# Patient Record
Sex: MALE | Race: WHITE | NOT HISPANIC OR LATINO
[De-identification: names, ages, dates, MRNs, and addresses within clinical notes are randomized per-mention and may not be internally consistent; named-entity substitution may affect disease eponyms.]

---

## 2017-03-15 ENCOUNTER — TRANSCRIPTION ENCOUNTER (OUTPATIENT)
Age: 57
End: 2017-03-15

## 2017-06-23 ENCOUNTER — OUTPATIENT (OUTPATIENT)
Dept: OUTPATIENT SERVICES | Facility: HOSPITAL | Age: 57
LOS: 1 days | Discharge: HOME | End: 2017-06-23

## 2017-06-28 DIAGNOSIS — I24.9 ACUTE ISCHEMIC HEART DISEASE, UNSPECIFIED: ICD-10-CM

## 2017-11-18 ENCOUNTER — TRANSCRIPTION ENCOUNTER (OUTPATIENT)
Age: 57
End: 2017-11-18

## 2019-04-29 ENCOUNTER — TRANSCRIPTION ENCOUNTER (OUTPATIENT)
Age: 59
End: 2019-04-29

## 2019-09-14 ENCOUNTER — TRANSCRIPTION ENCOUNTER (OUTPATIENT)
Age: 59
End: 2019-09-14

## 2019-09-14 ENCOUNTER — EMERGENCY (EMERGENCY)
Facility: HOSPITAL | Age: 59
LOS: 0 days | Discharge: HOME | End: 2019-09-14
Admitting: EMERGENCY MEDICINE
Payer: COMMERCIAL

## 2019-09-14 VITALS
WEIGHT: 244.05 LBS | OXYGEN SATURATION: 99 % | RESPIRATION RATE: 16 BRPM | HEIGHT: 74 IN | HEART RATE: 94 BPM | DIASTOLIC BLOOD PRESSURE: 75 MMHG | TEMPERATURE: 98 F | SYSTOLIC BLOOD PRESSURE: 128 MMHG

## 2019-09-14 DIAGNOSIS — M25.561 PAIN IN RIGHT KNEE: ICD-10-CM

## 2019-09-14 DIAGNOSIS — M25.569 PAIN IN UNSPECIFIED KNEE: ICD-10-CM

## 2019-09-14 DIAGNOSIS — M25.461 EFFUSION, RIGHT KNEE: ICD-10-CM

## 2019-09-14 PROCEDURE — 93971 EXTREMITY STUDY: CPT | Mod: 26,RT

## 2019-09-14 PROCEDURE — 99284 EMERGENCY DEPT VISIT MOD MDM: CPT

## 2019-09-14 PROCEDURE — 73562 X-RAY EXAM OF KNEE 3: CPT | Mod: 26,RT

## 2019-09-14 RX ORDER — OXYCODONE AND ACETAMINOPHEN 5; 325 MG/1; MG/1
1 TABLET ORAL ONCE
Refills: 0 | Status: DISCONTINUED | OUTPATIENT
Start: 2019-09-14 | End: 2019-09-14

## 2019-09-14 RX ADMIN — OXYCODONE AND ACETAMINOPHEN 1 TABLET(S): 5; 325 TABLET ORAL at 16:59

## 2019-09-14 NOTE — ED PROVIDER NOTE - CARE PROVIDER_API CALL
Jeevan Bishop (MD)  Orthopaedic Surgery  [address] [place] [address]  Phone: [phone]  Fax: [phone]  Follow Up Time:

## 2019-09-14 NOTE — ED PROVIDER NOTE - OBJECTIVE STATEMENT
58 y/o male with hx HDL presents to the ED c/o "I woke up with throbbing pain to my right knee this morning. I went to [facility] and they dx me with bursitis and gave me Meloxicam. The pain is radiating up my thigh and down my leg." no hx trauma/ fever/ chills/ numbness/ tingling/ weakness

## 2019-09-14 NOTE — ED ADULT TRIAGE NOTE - CHIEF COMPLAINT QUOTE
"I have pain in my right knee and it's swollen. I went to urgent care this morning and they diagnosed me with bursitis. Now the pain is getting worse."

## 2019-09-14 NOTE — ED PROVIDER NOTE - NSFOLLOWUPINSTRUCTIONS_ED_ALL_ED_FT
Knee Pain    WHAT YOU NEED TO KNOW:    What do I need to know about knee pain? Knee pain may start suddenly, or it may be a long-term problem. You may have pain on the side, front, or back of your knee. You may have knee stiffness and swelling. You may hear popping sounds or feel like your knee is giving way or locking up as you walk. You may feel pain when you sit, stand, walk, or climb up and down stairs.    What increases my risk for knee pain?     A strain or tear in ligaments or tendons      A leg fracture or knee dislocation      Overuse of your knee      Osteoarthritis, rheumatoid arthritis, or gout      An infection, tumor, or cyst in your knee      Shoes that are not supportive, or training on a hard surface      Sports that involve jumping or pivoting on your knee    How is the cause of knee pain diagnosed? Your healthcare provider will examine your knee and ask about your symptoms. Tell your provider when the pain started and what you were doing at the time. Describe the pain, such as sharp, throbbing, or achy. Tell your provider about any knee injury or surgery you had. You may need any of the following:       How is knee pain treated? Treatment will depend on the cause of your pain. You may need any of the following:     NSAIDs help decrease swelling and pain or fever. This medicine is available with or without a doctor's order. NSAIDs can cause stomach bleeding or kidney problems in certain people. If you take blood thinner medicine, always ask your healthcare provider if NSAIDs are safe for you. Always read the medicine label and follow directions.      Acetaminophen decreases pain and fever. It is available without a doctor's order. Ask how much to take and how often to take it. Follow directions. Read the labels of all other medicines you are using to see if they also contain acetaminophen, or ask your doctor or pharmacist. Acetaminophen can cause liver damage if not taken correctly. Do not use more than 4 grams (4,000 milligrams) total of acetaminophen in one day.       Prescription pain medicine may be given. Ask your healthcare provider how to take this medicine safely. Some prescription pain medicines contain acetaminophen. Do not take other medicines that contain acetaminophen without talking to your healthcare provider. Too much acetaminophen may cause liver damage. Prescription pain medicine may cause constipation. Ask your healthcare provider how to prevent or treat constipation.       Steroid injections may be given into your knee. Steroids reduce inflammation and pain.      Surgery may be used for some injuries, such as to repair a torn ACL.    What can I do to manage my symptoms?     Rest your knee so it can heal. Limit activities that increase your pain. Do low-impact exercises, such as walking or swimming.       Apply ice to help reduce swelling and pain. Use an ice pack, or put crushed ice in a plastic bag. Cover it with a towel before you apply it to your knee. Apply ice for 15 to 20 minutes every hour, or as directed.      Apply compression to help reduce swelling. Use a brace or bandage only as directed.      Elevate your knee to help decrease pain and swelling. Elevate your knee while you are sitting or lying down. Prop your leg on pillows to keep your knee above the level of your heart.      Prevent your knee from moving as directed. Your healthcare provider may put on a cast or splint. You may need to wear a leg brace to stabilize your knee. A leg brace can be adjusted to increase your range of motion as your knee heals.Hinged Knee Braces          What can I do to prevent knee pain?     Maintain a healthy weight. Extra weight increases your risk for knee pain. Ask your healthcare provider how much you should weigh. He or she can help you create a safe weight loss plan if you need to lose weight.      Exercise or train properly. Use the correct equipment for sports. Wear shoes that provide good support. Check your posture often as you exercise, play sports, or train for an event. This can help prevent stress and strain on your knees. Rest between sessions so you do not overwork your knees.    When should I seek immediate care?     Your pain is worse, even after treatment.       You cannot bend or straighten your leg completely.       The swelling around your knee does not go down even with treatment.      Your knee is painful and hot to the touch.     When should I contact my healthcare provider?     You have questions or concerns about your condition or care.

## 2019-09-14 NOTE — ED ADULT NURSE NOTE - OBJECTIVE STATEMENT
Patient is c.o of right knee pain and swelling. patient went to [facility] and was diagnosed with bursitis. Limited rom noted to knee. Patient is able to ambulate with minimal assistance noted. Denies any other symptoms or injury to area.

## 2019-09-14 NOTE — ED PROVIDER NOTE - PATIENT PORTAL LINK FT
You can access the FollowMyHealth Patient Portal offered by [facility] by registering at the following website: [email or web address]. By joining [employer]’s FollowMyHealth portal, you will also be able to view your health information using other applications (apps) compatible with our system.

## 2019-09-21 ENCOUNTER — OUTPATIENT (OUTPATIENT)
Dept: OUTPATIENT SERVICES | Facility: HOSPITAL | Age: 59
LOS: 1 days | Discharge: HOME | End: 2019-09-21
Payer: COMMERCIAL

## 2019-09-21 DIAGNOSIS — M25.561 PAIN IN RIGHT KNEE: ICD-10-CM

## 2019-09-21 PROCEDURE — 73721 MRI JNT OF LWR EXTRE W/O DYE: CPT | Mod: 26,RT

## 2020-07-21 PROBLEM — Z00.00 ENCOUNTER FOR PREVENTIVE HEALTH EXAMINATION: Status: ACTIVE | Noted: 2020-07-21

## 2020-09-09 ENCOUNTER — APPOINTMENT (OUTPATIENT)
Dept: UROLOGY | Facility: CLINIC | Age: 60
End: 2020-09-09
Payer: COMMERCIAL

## 2020-09-09 VITALS
WEIGHT: 250 LBS | DIASTOLIC BLOOD PRESSURE: 86 MMHG | BODY MASS INDEX: 32.08 KG/M2 | SYSTOLIC BLOOD PRESSURE: 120 MMHG | TEMPERATURE: 97.7 F | HEIGHT: 74 IN | HEART RATE: 93 BPM

## 2020-09-09 DIAGNOSIS — Z82.61 FAMILY HISTORY OF ARTHRITIS: ICD-10-CM

## 2020-09-09 DIAGNOSIS — Z78.9 OTHER SPECIFIED HEALTH STATUS: ICD-10-CM

## 2020-09-09 DIAGNOSIS — Z83.3 FAMILY HISTORY OF DIABETES MELLITUS: ICD-10-CM

## 2020-09-09 DIAGNOSIS — Z82.49 FAMILY HISTORY OF ISCHEMIC HEART DISEASE AND OTHER DISEASES OF THE CIRCULATORY SYSTEM: ICD-10-CM

## 2020-09-09 DIAGNOSIS — Z80.7 FAMILY HISTORY OF OTHER MALIGNANT NEOPLASMS OF LYMPHOID, HEMATOPOIETIC AND RELATED TISSUES: ICD-10-CM

## 2020-09-09 PROCEDURE — 99205 OFFICE O/P NEW HI 60 MIN: CPT

## 2020-09-09 RX ORDER — ASPIRIN 325 MG/1
TABLET, FILM COATED ORAL
Refills: 0 | Status: ACTIVE | COMMUNITY

## 2020-09-09 NOTE — ASSESSMENT
[FreeTextEntry1] : This is a 60 year male who presents for elevated PSA which came down somewhat on subsequent lab\par \par records from Dr Estrada reviewed below\par August 2019 -3.7\par June 2020- 5.7\par August 2020- 4.3\par \par states had sex and ride bike around the time of the labs\par no blood in the urine\par no associate burning to penis with urination\par \par for ED takes tadalafil without issues and helps well with erections - 5mg as needed\par \par IIEF 5 = 22 - minimal severity erectile dysfunction\par \par IPSS = 10- mostly nocturia x 2 and some weak stream and intermittency - gradual onset, getting worse\par 60g prostate on EARLE

## 2020-09-09 NOTE — PHYSICAL EXAM
[General Appearance - Well Developed] : well developed [General Appearance - Well Nourished] : well nourished [Well Groomed] : well groomed [General Appearance - In No Acute Distress] : no acute distress [Normal Appearance] : normal appearance [Edema] : no peripheral edema [Abdomen Tenderness] : non-tender [Abdomen Soft] : soft [Exaggerated Use Of Accessory Muscles For Inspiration] : no accessory muscle use [Respiration, Rhythm And Depth] : normal respiratory rhythm and effort [Urethral Meatus] : meatus normal [Costovertebral Angle Tenderness] : no ~M costovertebral angle tenderness [Urinary Bladder Findings] : the bladder was normal on palpation [Testes Mass (___cm)] : there were no testicular masses [Scrotum] : the scrotum was normal [No Prostate Nodules] : no prostate nodules [Normal Station and Gait] : the gait and station were normal for the patient's age [Prostate Size ___ gm] : prostate size [unfilled] gm [No Focal Deficits] : no focal deficits [] : no rash [Oriented To Time, Place, And Person] : oriented to person, place, and time [Affect] : the affect was normal [Mood] : the mood was normal [Not Anxious] : not anxious [Skin Color & Pigmentation] : normal skin color and pigmentation [Femoral Lymph Nodes Enlarged Bilaterally] : femoral [Inguinal Lymph Nodes Enlarged Bilaterally] : inguinal

## 2020-09-09 NOTE — LETTER BODY
[Dear  ___] : Dear  [unfilled], [Consult Letter:] : I had the pleasure of evaluating your patient, [unfilled]. [Please see my note below.] : Please see my note below. [Consult Closing:] : Thank you very much for allowing me to participate in the care of this patient.  If you have any questions, please do not hesitate to contact me. [Sincerely,] : Sincerely, [FreeTextEntry3] : Brandon Soto MD\par Director of Male Sexual Dysfunction and Urologic Prosthetics

## 2020-09-24 ENCOUNTER — OUTPATIENT (OUTPATIENT)
Dept: OUTPATIENT SERVICES | Facility: HOSPITAL | Age: 60
LOS: 1 days | Discharge: HOME | End: 2020-09-24
Payer: COMMERCIAL

## 2020-09-24 DIAGNOSIS — R35.1 NOCTURIA: ICD-10-CM

## 2020-09-24 PROCEDURE — 76857 US EXAM PELVIC LIMITED: CPT | Mod: 26

## 2020-10-21 ENCOUNTER — APPOINTMENT (OUTPATIENT)
Dept: UROLOGY | Facility: CLINIC | Age: 60
End: 2020-10-21
Payer: COMMERCIAL

## 2020-10-21 DIAGNOSIS — N32.3 DIVERTICULUM OF BLADDER: ICD-10-CM

## 2020-10-21 DIAGNOSIS — N40.0 BENIGN PROSTATIC HYPERPLASIA WITHOUT LOWER URINARY TRACT SYMPMS: ICD-10-CM

## 2020-10-21 DIAGNOSIS — N52.01 ERECTILE DYSFUNCTION DUE TO ARTERIAL INSUFFICIENCY: ICD-10-CM

## 2020-10-21 DIAGNOSIS — R35.1 NOCTURIA: ICD-10-CM

## 2020-10-21 PROCEDURE — 99214 OFFICE O/P EST MOD 30 MIN: CPT

## 2020-10-21 RX ORDER — TAMSULOSIN HYDROCHLORIDE 0.4 MG/1
0.4 CAPSULE ORAL
Qty: 90 | Refills: 3 | Status: ACTIVE | COMMUNITY
Start: 2020-10-21 | End: 1900-01-01

## 2020-10-21 NOTE — PHYSICAL EXAM
[General Appearance - Well Developed] : well developed [General Appearance - Well Nourished] : well nourished [Normal Appearance] : normal appearance [Well Groomed] : well groomed [General Appearance - In No Acute Distress] : no acute distress [Abdomen Soft] : soft [Abdomen Tenderness] : non-tender [Costovertebral Angle Tenderness] : no ~M costovertebral angle tenderness [Urethral Meatus] : meatus normal [Urinary Bladder Findings] : the bladder was normal on palpation [Scrotum] : the scrotum was normal [Testes Mass (___cm)] : there were no testicular masses [No Prostate Nodules] : no prostate nodules [Prostate Size ___ gm] : prostate size [unfilled] gm [Skin Color & Pigmentation] : normal skin color and pigmentation [Edema] : no peripheral edema [] : no respiratory distress [Respiration, Rhythm And Depth] : normal respiratory rhythm and effort [Exaggerated Use Of Accessory Muscles For Inspiration] : no accessory muscle use [Oriented To Time, Place, And Person] : oriented to person, place, and time [Affect] : the affect was normal [Mood] : the mood was normal [Not Anxious] : not anxious [Normal Station and Gait] : the gait and station were normal for the patient's age [No Focal Deficits] : no focal deficits [Femoral Lymph Nodes Enlarged Bilaterally] : femoral [Inguinal Lymph Nodes Enlarged Bilaterally] : inguinal

## 2020-10-21 NOTE — HISTORY OF PRESENT ILLNESS
[FreeTextEntry1] : This is a 60 year male who presents for elevated PSA which came down somewhat on subsequent lab\par \par records from Dr Estrada reviewed below\par August 2019 -3.7\par June 2020- 5.7\par August 2020- 4.3\par sept 2020 - 3.5, % free psa of 23\par \par states had sex and ride bike around the time of the labs\par no blood in the urine\par no associate burning to penis with urination\par \par for ED takes tadalafil without issues and helps well with erections - 5mg as needed\par \par IIEF 5 = 22 - minimal severity erectile dysfunction\par \par IPSS = 10- mostly nocturia x 2 and some weak stream and intermittency - gradual onset, getting worse\par 60g prostate on EARLE. \par \par Sept 2020-- \par Culture - Urine; no growth \par PSA Profile - Total -- 3.5, % free psa of 23\par Urinalysis w/Reflex; negative\par US Urinary Bladder;  images visualized by me --- prostate 60g with pvr of 200ml, pre 400ml -- small right side diverticulum

## 2021-01-06 ENCOUNTER — APPOINTMENT (OUTPATIENT)
Dept: UROLOGY | Facility: CLINIC | Age: 61
End: 2021-01-06
Payer: COMMERCIAL

## 2021-01-06 VITALS
DIASTOLIC BLOOD PRESSURE: 82 MMHG | HEIGHT: 74 IN | HEART RATE: 75 BPM | WEIGHT: 268 LBS | TEMPERATURE: 97.8 F | SYSTOLIC BLOOD PRESSURE: 131 MMHG | BODY MASS INDEX: 34.39 KG/M2

## 2021-01-06 DIAGNOSIS — R97.20 ELEVATED PROSTATE, SPECIFIC ANTIGEN [PSA]: ICD-10-CM

## 2021-01-06 DIAGNOSIS — N40.1 BENIGN PROSTATIC HYPERPLASIA WITH LOWER URINARY TRACT SYMPMS: ICD-10-CM

## 2021-01-06 DIAGNOSIS — N13.8 BENIGN PROSTATIC HYPERPLASIA WITH LOWER URINARY TRACT SYMPMS: ICD-10-CM

## 2021-01-06 PROCEDURE — 99213 OFFICE O/P EST LOW 20 MIN: CPT

## 2021-01-06 PROCEDURE — 99072 ADDL SUPL MATRL&STAF TM PHE: CPT

## 2021-01-06 NOTE — PHYSICAL EXAM
[General Appearance - Well Developed] : well developed [General Appearance - Well Nourished] : well nourished [Normal Appearance] : normal appearance [Well Groomed] : well groomed [General Appearance - In No Acute Distress] : no acute distress [Abdomen Soft] : soft [Abdomen Tenderness] : non-tender [Costovertebral Angle Tenderness] : no ~M costovertebral angle tenderness [Skin Color & Pigmentation] : normal skin color and pigmentation [Edema] : no peripheral edema [] : no respiratory distress [Exaggerated Use Of Accessory Muscles For Inspiration] : no accessory muscle use [Oriented To Time, Place, And Person] : oriented to person, place, and time [Affect] : the affect was normal [Mood] : the mood was normal [Not Anxious] : not anxious [Normal Station and Gait] : the gait and station were normal for the patient's age [No Focal Deficits] : no focal deficits [Femoral Lymph Nodes Enlarged Bilaterally] : femoral [Inguinal Lymph Nodes Enlarged Bilaterally] : inguinal

## 2021-01-06 NOTE — LETTER BODY
[Dear  ___] : Dear  [unfilled], [Consult Letter:] : I had the pleasure of evaluating your patient, [unfilled]. [Please see my note below.] : Please see my note below. [Consult Closing:] : Thank you very much for allowing me to participate in the care of this patient.  If you have any questions, please do not hesitate to contact me. [Sincerely,] : Sincerely, [FreeTextEntry3] : Bradnon Soto MD\par Director of Male Sexual Dysfunction and Urologic Prosthetics

## 2021-01-06 NOTE — ASSESSMENT
[FreeTextEntry1] : \par This is a 60 year male who presents for elevated PSA which came down somewhat on subsequent lab\par \par records from Dr Estrada reviewed below\par August 2019 -3.7\par June 2020- 5.7\par August 2020- 4.3\par sept 2020 - 3.5, % free psa of 23 \par \par states had sex and ride bike around the time of the labs\par no blood in the urine\par no associate burning to penis with urination\par \par for ED takes tadalafil without issues and helps well with erections - 5mg as needed\par \par IIEF 5 = 22 - minimal severity erectile dysfunction\par \par IPSS = 10- mostly nocturia x 2 and some weak stream and intermittency - gradual onset, getting worse\par 60g prostate on EARLE. \par \par Sept 2020-- \par Culture - Urine; no growth \par PSA Profile - Total -- 3.5, % free psa of 23\par Urinalysis w/Reflex; negative\par US Urinary Bladder; images visualized by me --- prostate 60g with pvr of 200ml, pre 400ml -- small right side diverticulum. \par \par dec 2020\par PSA Profile - Total - 3.7, % free = 27\par

## 2021-02-09 ENCOUNTER — EMERGENCY (EMERGENCY)
Facility: HOSPITAL | Age: 61
LOS: 0 days | Discharge: HOME | End: 2021-02-09
Attending: STUDENT IN AN ORGANIZED HEALTH CARE EDUCATION/TRAINING PROGRAM | Admitting: STUDENT IN AN ORGANIZED HEALTH CARE EDUCATION/TRAINING PROGRAM
Payer: COMMERCIAL

## 2021-02-09 VITALS
RESPIRATION RATE: 20 BRPM | HEART RATE: 71 BPM | DIASTOLIC BLOOD PRESSURE: 77 MMHG | SYSTOLIC BLOOD PRESSURE: 134 MMHG | OXYGEN SATURATION: 100 %

## 2021-02-09 VITALS
HEIGHT: 74 IN | DIASTOLIC BLOOD PRESSURE: 90 MMHG | SYSTOLIC BLOOD PRESSURE: 150 MMHG | HEART RATE: 75 BPM | TEMPERATURE: 99 F | WEIGHT: 265 LBS | RESPIRATION RATE: 20 BRPM | OXYGEN SATURATION: 100 %

## 2021-02-09 DIAGNOSIS — R51.9 HEADACHE, UNSPECIFIED: ICD-10-CM

## 2021-02-09 DIAGNOSIS — R09.81 NASAL CONGESTION: ICD-10-CM

## 2021-02-09 DIAGNOSIS — E78.5 HYPERLIPIDEMIA, UNSPECIFIED: ICD-10-CM

## 2021-02-09 DIAGNOSIS — R12 HEARTBURN: ICD-10-CM

## 2021-02-09 LAB
ALBUMIN SERPL ELPH-MCNC: 4.6 G/DL — SIGNIFICANT CHANGE UP (ref 3.5–5.2)
ALP SERPL-CCNC: 59 U/L — SIGNIFICANT CHANGE UP (ref 30–115)
ALT FLD-CCNC: 39 U/L — SIGNIFICANT CHANGE UP (ref 0–41)
ANION GAP SERPL CALC-SCNC: 10 MMOL/L — SIGNIFICANT CHANGE UP (ref 7–14)
AST SERPL-CCNC: 33 U/L — SIGNIFICANT CHANGE UP (ref 0–41)
BASOPHILS # BLD AUTO: 0.02 K/UL — SIGNIFICANT CHANGE UP (ref 0–0.2)
BASOPHILS NFR BLD AUTO: 0.3 % — SIGNIFICANT CHANGE UP (ref 0–1)
BILIRUB SERPL-MCNC: 0.6 MG/DL — SIGNIFICANT CHANGE UP (ref 0.2–1.2)
BUN SERPL-MCNC: 16 MG/DL — SIGNIFICANT CHANGE UP (ref 10–20)
CALCIUM SERPL-MCNC: 9.1 MG/DL — SIGNIFICANT CHANGE UP (ref 8.5–10.1)
CHLORIDE SERPL-SCNC: 101 MMOL/L — SIGNIFICANT CHANGE UP (ref 98–110)
CO2 SERPL-SCNC: 25 MMOL/L — SIGNIFICANT CHANGE UP (ref 17–32)
CREAT SERPL-MCNC: 1.1 MG/DL — SIGNIFICANT CHANGE UP (ref 0.7–1.5)
EOSINOPHIL # BLD AUTO: 0.09 K/UL — SIGNIFICANT CHANGE UP (ref 0–0.7)
EOSINOPHIL NFR BLD AUTO: 1.3 % — SIGNIFICANT CHANGE UP (ref 0–8)
GLUCOSE SERPL-MCNC: 96 MG/DL — SIGNIFICANT CHANGE UP (ref 70–99)
HCT VFR BLD CALC: 45.1 % — SIGNIFICANT CHANGE UP (ref 42–52)
HGB BLD-MCNC: 14.9 G/DL — SIGNIFICANT CHANGE UP (ref 14–18)
IMM GRANULOCYTES NFR BLD AUTO: 0.3 % — SIGNIFICANT CHANGE UP (ref 0.1–0.3)
LIDOCAIN IGE QN: 22 U/L — SIGNIFICANT CHANGE UP (ref 7–60)
LYMPHOCYTES # BLD AUTO: 2.24 K/UL — SIGNIFICANT CHANGE UP (ref 1.2–3.4)
LYMPHOCYTES # BLD AUTO: 32.7 % — SIGNIFICANT CHANGE UP (ref 20.5–51.1)
MAGNESIUM SERPL-MCNC: 2.2 MG/DL — SIGNIFICANT CHANGE UP (ref 1.8–2.4)
MCHC RBC-ENTMCNC: 30.2 PG — SIGNIFICANT CHANGE UP (ref 27–31)
MCHC RBC-ENTMCNC: 33 G/DL — SIGNIFICANT CHANGE UP (ref 32–37)
MCV RBC AUTO: 91.5 FL — SIGNIFICANT CHANGE UP (ref 80–94)
MONOCYTES # BLD AUTO: 0.41 K/UL — SIGNIFICANT CHANGE UP (ref 0.1–0.6)
MONOCYTES NFR BLD AUTO: 6 % — SIGNIFICANT CHANGE UP (ref 1.7–9.3)
NEUTROPHILS # BLD AUTO: 4.07 K/UL — SIGNIFICANT CHANGE UP (ref 1.4–6.5)
NEUTROPHILS NFR BLD AUTO: 59.4 % — SIGNIFICANT CHANGE UP (ref 42.2–75.2)
NRBC # BLD: 0 /100 WBCS — SIGNIFICANT CHANGE UP (ref 0–0)
NT-PROBNP SERPL-SCNC: 55 PG/ML — SIGNIFICANT CHANGE UP (ref 0–300)
PLATELET # BLD AUTO: 270 K/UL — SIGNIFICANT CHANGE UP (ref 130–400)
POTASSIUM SERPL-MCNC: 4.8 MMOL/L — SIGNIFICANT CHANGE UP (ref 3.5–5)
POTASSIUM SERPL-SCNC: 4.8 MMOL/L — SIGNIFICANT CHANGE UP (ref 3.5–5)
PROT SERPL-MCNC: 7 G/DL — SIGNIFICANT CHANGE UP (ref 6–8)
RBC # BLD: 4.93 M/UL — SIGNIFICANT CHANGE UP (ref 4.7–6.1)
RBC # FLD: 13 % — SIGNIFICANT CHANGE UP (ref 11.5–14.5)
SODIUM SERPL-SCNC: 136 MMOL/L — SIGNIFICANT CHANGE UP (ref 135–146)
TROPONIN T SERPL-MCNC: <0.01 NG/ML — SIGNIFICANT CHANGE UP
WBC # BLD: 6.85 K/UL — SIGNIFICANT CHANGE UP (ref 4.8–10.8)
WBC # FLD AUTO: 6.85 K/UL — SIGNIFICANT CHANGE UP (ref 4.8–10.8)

## 2021-02-09 PROCEDURE — 71045 X-RAY EXAM CHEST 1 VIEW: CPT | Mod: 26

## 2021-02-09 PROCEDURE — 93010 ELECTROCARDIOGRAM REPORT: CPT

## 2021-02-09 PROCEDURE — 99285 EMERGENCY DEPT VISIT HI MDM: CPT

## 2021-02-09 RX ORDER — METOCLOPRAMIDE HCL 10 MG
10 TABLET ORAL ONCE
Refills: 0 | Status: COMPLETED | OUTPATIENT
Start: 2021-02-09 | End: 2021-02-09

## 2021-02-09 RX ORDER — ACETAMINOPHEN 500 MG
975 TABLET ORAL ONCE
Refills: 0 | Status: COMPLETED | OUTPATIENT
Start: 2021-02-09 | End: 2021-02-09

## 2021-02-09 RX ADMIN — Medication 975 MILLIGRAM(S): at 11:24

## 2021-02-09 RX ADMIN — Medication 104 MILLIGRAM(S): at 11:23

## 2021-02-09 NOTE — ED PROVIDER NOTE - NSFOLLOWUPCLINICS_GEN_ALL_ED_FT
[facility]  Neurology  [address] [place] [address], [facility] [address]  [place] [address]  Phone: [phone]  Fax:   Follow Up Time:      [facility]  Neurology  [address] [place] [address], [address]  [place] [address]  Phone: [phone]  Fax:

## 2021-02-09 NOTE — ED PROVIDER NOTE - CARE PROVIDERS DIRECT ADDRESSES
,[email or web address]_Unknown,[email or web address]

## 2021-02-09 NOTE — ED PROVIDER NOTE - OBJECTIVE STATEMENT
60y M pmh cluster headaches, DLD presenting with headache x2 days. R periorbital with radiation diffusely. +nasal congestion. No f/c/n/v. Associated with heartburn, left sided this morning with burning to the back of the throat. Persistent headache. Moderate in intensity. No blurry vision, but increased tearing to R eye. No f/c/n/v. No trauma/falls. No hx of cad/stents. Nonsmoker. No neck stiffness.

## 2021-02-09 NOTE — ED PROVIDER NOTE - CLINICAL SUMMARY MEDICAL DECISION MAKING FREE TEXT BOX
ha markedly improved, serial neuro exams benign. ha initially similar to previous and w/o red flag sx.  atypical acs w/u for heart burn negative. will provide outpt f/u and discussed return precautions

## 2021-02-09 NOTE — ED ADULT NURSE NOTE - NS ED NURSE DISCH DISPOSITION
OFFICE VISIT      Patient: Cristofer Dawn Date of Service: 2019   : 1957 MRN: [id number]     SUBJECTIVE:   HISTORY OF PRESENT ILLNESS:  Cristofer Dawn is a 61 year old male who presents today for Past 24 hrs. Noting nasal congestion and cough yesterday which increased during nite and had post tussive emesis.  Fever last nite with sweating  Denies diarrhea, ear pain    Took tylenol ~1pm w/o effect      PAST MEDICAL HISTORY:  Past Medical History:   Diagnosis Date   • Arthritis    • Elevated cholesterol    • Essential (primary) hypertension        MEDICATIONS:  Current Outpatient Medications   Medication Sig   • folic acid (FOLATE) 1 MG tablet Take 1 mg by mouth.   • Abatacept (ORENCIA CLICKJECT) 125 MG/ML Solution Auto-injector Inject 125 mg into the skin.   • enalapril-hydrochlorothiazide (VASERETIC) 10-25 MG per tablet Take 1 tablet by mouth.   • METHOTREXATE PO Take 20 mg by mouth.   • pravastatin (PRAVACHOL) 40 MG tablet Take 40 mg by mouth.   • Promethazine-DM 6.25-15 MG/5ML Solution Take 5 mLs by mouth every 8 hours as needed (Take as needed for cough @ nite).     No current facility-administered medications for this visit.        ALLERGIES:  ALLERGIES:  No Known Allergies    PAST SURGICAL HISTORY:  Past Surgical History:   Procedure Laterality Date   • Ankle surgery     • Hernia repair         FAMILY HISTORY:  History reviewed. No pertinent family history.    SOCIAL HISTORY:  Social History     Tobacco Use   • Smoking status: Never Smoker   • Smokeless tobacco: Never Used   Substance Use Topics   • Alcohol use: Not on file   • Drug use: Not on file       Review of Systems   Constitutional: Positive for fever (felt feverish last nite).   HENT: Positive for congestion, rhinorrhea and sore throat (from coughing).    Eyes: Negative.    Respiratory: Positive for cough.    Cardiovascular: Negative.    Gastrointestinal: Negative.    Endocrine: Negative.    Genitourinary: Negative.    Musculoskeletal: Negative.     Skin: Negative.    Allergic/Immunologic: Negative.    Neurological: Negative.    Hematological: Negative.    Psychiatric/Behavioral: Negative.          OBJECTIVE:     Physical Exam   Constitutional: He is oriented to person, place, and time. He appears well-developed and well-nourished.   HENT:   Head: Normocephalic and atraumatic.   Right Ear: Tympanic membrane, external ear and ear canal normal.   Left Ear: Tympanic membrane, external ear and ear canal normal.   Nose: Mucosal edema and rhinorrhea present. Right sinus exhibits no maxillary sinus tenderness and no frontal sinus tenderness. Left sinus exhibits no maxillary sinus tenderness and no frontal sinus tenderness.   Mouth/Throat: Uvula is midline and mucous membranes are normal. Posterior oropharyngeal erythema present.   Eyes: Conjunctivae and EOM are normal.   Neck: Normal range of motion. Neck supple.   Cardiovascular: Normal rate, regular rhythm and normal heart sounds.   Pulmonary/Chest: Effort normal and breath sounds normal. Musculoskeletal: Normal range of motion.     Lymphadenopathy:     He has no cervical adenopathy.   Neurological: He is alert and oriented to person, place, and time.   Skin: Skin is warm and dry.   Psychiatric: He has a normal mood and affect. His behavior is normal.   Vitals reviewed.      Visit Vitals  /88   Pulse 80   Temp 98.7 °F (37.1 °C) (Temporal)   Resp 18   Ht 5' 10\" (1.778 m)   Wt 97.5 kg (215 lb)   BMI 30.85 kg/m²         Wt Readings from Last 1 Encounters:   08/23/19 97.5 kg (215 lb)          DIAGNOSTIC STUDIES:   LAB RESULTS:  No results found for this visit on 08/23/19.    Lab Results Reviewed    ASSESSMENT AND PLAN:   This is a 61 year old year-old male who presents with cough.    Diagnoses and all orders for this visit:  Viral upper respiratory infection  -     Promethazine-DM 6.25-15 MG/5ML Solution; Take 5 mLs by mouth every 8 hours as needed (Take as needed for cough @ nite).      Return if symptoms  worsen or fail to improve.  Ibuprofen 400mg every 4-6 hrs. OR 600mg every 6 hrs with food  OR Tylenol 325-500mg (2) every 6 hrs.  Claritin during the day to decrease post nasal drip type cough  Increase fluids  Cool mist humidifier in room @ nite  Sucrets/cepacol/chloroseptic lozenges        Instructions provided as documented in the AVS.      The patient indicated understanding of the diagnosis and agreed with the plan of care.      Xin Diamond, PEARL  8/23/2019    Discharged

## 2021-02-09 NOTE — ED ADULT TRIAGE NOTE - CHIEF COMPLAINT QUOTE
patient reports headaches for last few weeks. Hx of cluster head aches 20 years ago. states this episode started yesterday accompanied by nausea and heart burn, taking tylenol with no relief

## 2021-02-09 NOTE — ED PROVIDER NOTE - CARE PROVIDER_API CALL
Gale Villalba)  Cardiology; Interventional Cardiology  [address] [place] [address]  Phone: [phone]  Fax: [phone]  Follow Up Time:     Zachary Henry)  Gastroenterology; Internal Medicine  [address] [place] [id number]  Phone: [phone]  Fax: [phone]  Follow Up Time:

## 2021-02-24 PROBLEM — G44.009 CLUSTER HEADACHE SYNDROME, UNSPECIFIED, NOT INTRACTABLE: Chronic | Status: ACTIVE | Noted: 2021-02-15

## 2021-02-24 PROBLEM — E78.00 PURE HYPERCHOLESTEROLEMIA, UNSPECIFIED: Chronic | Status: ACTIVE | Noted: 2021-02-15

## 2021-03-02 ENCOUNTER — OUTPATIENT (OUTPATIENT)
Dept: OUTPATIENT SERVICES | Facility: HOSPITAL | Age: 61
LOS: 1 days | Discharge: HOME | End: 2021-03-02
Payer: COMMERCIAL

## 2021-03-02 DIAGNOSIS — R51.0 HEADACHE WITH ORTHOSTATIC COMPONENT, NOT ELSEWHERE CLASSIFIED: ICD-10-CM

## 2021-03-02 PROCEDURE — 70551 MRI BRAIN STEM W/O DYE: CPT | Mod: 26

## 2021-03-07 ENCOUNTER — LABORATORY RESULT (OUTPATIENT)
Age: 61
End: 2021-03-07

## 2021-03-07 ENCOUNTER — OUTPATIENT (OUTPATIENT)
Dept: OUTPATIENT SERVICES | Facility: HOSPITAL | Age: 61
LOS: 1 days | Discharge: HOME | End: 2021-03-07

## 2021-03-07 DIAGNOSIS — Z11.59 ENCOUNTER FOR SCREENING FOR OTHER VIRAL DISEASES: ICD-10-CM

## 2021-03-10 ENCOUNTER — RESULT REVIEW (OUTPATIENT)
Age: 61
End: 2021-03-10

## 2021-03-10 ENCOUNTER — OUTPATIENT (OUTPATIENT)
Dept: OUTPATIENT SERVICES | Facility: HOSPITAL | Age: 61
LOS: 1 days | Discharge: HOME | End: 2021-03-10
Payer: COMMERCIAL

## 2021-03-10 DIAGNOSIS — R97.20 ELEVATED PROSTATE SPECIFIC ANTIGEN [PSA]: ICD-10-CM

## 2021-03-10 PROCEDURE — 72197 MRI PELVIS W/O & W/DYE: CPT | Mod: 26

## 2021-03-12 ENCOUNTER — NON-APPOINTMENT (OUTPATIENT)
Age: 61
End: 2021-03-12

## 2021-03-22 ENCOUNTER — APPOINTMENT (OUTPATIENT)
Dept: UROLOGY | Facility: CLINIC | Age: 61
End: 2021-03-22

## 2021-03-23 ENCOUNTER — OUTPATIENT (OUTPATIENT)
Dept: OUTPATIENT SERVICES | Facility: HOSPITAL | Age: 61
LOS: 1 days | Discharge: HOME | End: 2021-03-23
Payer: COMMERCIAL

## 2021-03-23 DIAGNOSIS — R51.9 HEADACHE, UNSPECIFIED: ICD-10-CM

## 2021-03-23 PROCEDURE — 70544 MR ANGIOGRAPHY HEAD W/O DYE: CPT | Mod: 26

## 2021-03-24 ENCOUNTER — RESULT REVIEW (OUTPATIENT)
Age: 61
End: 2021-03-24

## 2021-03-24 ENCOUNTER — OUTPATIENT (OUTPATIENT)
Dept: OUTPATIENT SERVICES | Facility: HOSPITAL | Age: 61
LOS: 1 days | Discharge: HOME | End: 2021-03-24
Payer: COMMERCIAL

## 2021-03-24 DIAGNOSIS — R97.20 ELEVATED PROSTATE SPECIFIC ANTIGEN [PSA]: ICD-10-CM

## 2021-03-24 PROCEDURE — 78306 BONE IMAGING WHOLE BODY: CPT | Mod: 26

## 2021-03-24 PROCEDURE — 78803 RP LOCLZJ TUM SPECT 1 AREA: CPT | Mod: 26

## 2021-04-01 ENCOUNTER — NON-APPOINTMENT (OUTPATIENT)
Age: 61
End: 2021-04-01

## 2021-04-01 ENCOUNTER — OUTPATIENT (OUTPATIENT)
Dept: OUTPATIENT SERVICES | Facility: HOSPITAL | Age: 61
LOS: 1 days | Discharge: HOME | End: 2021-04-01
Payer: COMMERCIAL

## 2021-04-01 DIAGNOSIS — R93.5 ABNORMAL FINDINGS ON DIAGNOSTIC IMAGING OF OTHER ABDOMINAL REGIONS, INCLUDING RETROPERITONEUM: ICD-10-CM

## 2021-04-01 DIAGNOSIS — R51.9 HEADACHE, UNSPECIFIED: ICD-10-CM

## 2021-04-01 PROCEDURE — 76700 US EXAM ABDOM COMPLETE: CPT | Mod: 26

## 2021-08-25 ENCOUNTER — OUTPATIENT (OUTPATIENT)
Dept: OUTPATIENT SERVICES | Facility: HOSPITAL | Age: 61
LOS: 1 days | Discharge: HOME | End: 2021-08-25
Payer: COMMERCIAL

## 2021-08-25 DIAGNOSIS — M25.851 OTHER SPECIFIED JOINT DISORDERS, RIGHT HIP: ICD-10-CM

## 2021-08-25 PROCEDURE — 78306 BONE IMAGING WHOLE BODY: CPT | Mod: 26

## 2021-08-25 PROCEDURE — 78803 RP LOCLZJ TUM SPECT 1 AREA: CPT | Mod: 26

## 2021-08-30 ENCOUNTER — NON-APPOINTMENT (OUTPATIENT)
Age: 61
End: 2021-08-30

## 2021-09-15 ENCOUNTER — APPOINTMENT (OUTPATIENT)
Age: 61
End: 2021-09-15

## 2022-03-15 NOTE — ED PROVIDER NOTE - PATIENT PORTAL LINK FT
New Abx sent    You can access the FollowMyHealth Patient Portal offered by [facility] by registering at the following website: [email or web address]. By joining [facility]’s FollowMyHealth portal, you will also be able to view your health information using other applications (apps) compatible with our system.

## 2022-07-06 NOTE — ED PROVIDER NOTE - ATTENDING CONTRIBUTION TO CARE
Quality 431: Preventive Care And Screening: Unhealthy Alcohol Use - Screening: Patient not identified as an unhealthy alcohol user when screened for unhealthy alcohol use using a systematic screening method Detail Level: Detailed 61 yo m hx hld  pt endorses hx cluster headaches, 20 years ago. pt had seen neuro in the past and stopped f/u when ha's subsided. pt states he had R occipital headache. pt woke up with the headache after his alarm woke him up yesterday AM. weinstein was mild/persistent.  associated runny nose and R eye tearing similar to previous cluster HA. mild R eye BV which has subsided.  pt also endorsing intermittent numbness sensation to hands/feet. pt also c/o burning to back of throat and chest which feels like his classic heart burn.  no hx cad/stents/cardiac w/u.  nonsmoker. no fever, neck stiffness, leg pain/swelling.      vss  gen- NAD, aaox3  Eyes-  card-rrr  lungs-ctab, no wheezing or rhonchi  abd-sntnd, no guarding or rebound  neuro- full str/sensation, cn ii-xii grossly intact, normal coordination and gait  LE- no swelling    will get screening labs, ekg, trop, supportive care for headache Quality 130: Documentation Of Current Medications In The Medical Record: Current Medications Documented Quality 47: Advance Care Plan: Advance Care Planning discussed and documented; advance care plan or surrogate decision maker documented in the medical record. Quality 226: Preventive Care And Screening: Tobacco Use: Screening And Cessation Intervention: Patient screened for tobacco use and is an ex/non-smoker 61 yo m hx hld  pt endorses hx cluster headaches, 20 years ago. pt had seen neuro in the past and stopped f/u when ha's subsided. pt states he had R occipital headache. pt woke up with the headache after his alarm woke him up yesterday AM. weinstein was mild/persistent up until just before arriving to ER. pt states ha was never as severe as his previous cluster headaches and he was able to work through it yesterday. now, headache is 1/10.  associated runny nose and R eye tearing similar to previous cluster HA. no blurry vision- pt states just some tearing.  no numbness or weakness, pt states he felt like his feet were cool but that has also subsided.  pt also c/o burning to back of throat and chest which feels like his classic heart burn after he took acetaminophen at 10am yesterday. heartburn resolved at midnight.  no hx cad/stents/cardiac w/u.  nonsmoker. no fever, neck stiffness, leg pain/swelling.      vss  gen- NAD, aaox3  card-rrr  lungs-ctab, no wheezing or rhonchi  abd-sntnd, no guarding or rebound  neuro- full str/sensation, cn ii-xii grossly intact, normal coordination and gait  LE- no swelling    will get screening labs, ekg, trop, supportive care for headache  sx overall improving. given timing of heartburn, can do 1 trop and cards f/u if negative  will give trial of reglan and if that helps dc on reglan  no need for brain imaging emergently given previously established dx, no red flag sx and overall improving sx

## 2024-03-22 ENCOUNTER — APPOINTMENT (OUTPATIENT)
Dept: CARDIOLOGY | Facility: CLINIC | Age: 64
End: 2024-03-22
Payer: COMMERCIAL

## 2024-03-22 DIAGNOSIS — I25.10 ATHEROSCLEROTIC HEART DISEASE OF NATIVE CORONARY ARTERY W/OUT ANGINA PECTORIS: ICD-10-CM

## 2024-03-22 PROCEDURE — 93325 DOPPLER ECHO COLOR FLOW MAPG: CPT

## 2024-03-22 PROCEDURE — 93351 STRESS TTE COMPLETE: CPT

## 2024-03-22 PROCEDURE — 93320 DOPPLER ECHO COMPLETE: CPT

## 2025-03-31 ENCOUNTER — APPOINTMENT (OUTPATIENT)
Dept: UROLOGY | Facility: CLINIC | Age: 65
End: 2025-03-31
Payer: COMMERCIAL

## 2025-03-31 ENCOUNTER — NON-APPOINTMENT (OUTPATIENT)
Age: 65
End: 2025-03-31

## 2025-03-31 VITALS
BODY MASS INDEX: 35.29 KG/M2 | HEIGHT: 74 IN | TEMPERATURE: 99.1 F | DIASTOLIC BLOOD PRESSURE: 85 MMHG | HEART RATE: 93 BPM | WEIGHT: 275 LBS | RESPIRATION RATE: 16 BRPM | SYSTOLIC BLOOD PRESSURE: 134 MMHG | OXYGEN SATURATION: 95 %

## 2025-03-31 DIAGNOSIS — N52.01 ERECTILE DYSFUNCTION DUE TO ARTERIAL INSUFFICIENCY: ICD-10-CM

## 2025-03-31 DIAGNOSIS — R97.20 ELEVATED PROSTATE, SPECIFIC ANTIGEN [PSA]: ICD-10-CM

## 2025-03-31 PROCEDURE — 99203 OFFICE O/P NEW LOW 30 MIN: CPT

## 2025-03-31 PROCEDURE — G2211 COMPLEX E/M VISIT ADD ON: CPT | Mod: NC

## 2025-03-31 RX ORDER — ATORVASTATIN CALCIUM 80 MG/1
TABLET, FILM COATED ORAL
Refills: 0 | Status: ACTIVE | COMMUNITY

## 2025-03-31 RX ORDER — TADALAFIL 10 MG/1
10 TABLET, FILM COATED ORAL
Refills: 0 | Status: ACTIVE | COMMUNITY

## 2025-03-31 RX ORDER — LEVOCETIRIZINE DIHYDROCHLORIDE 5 MG/1
5 TABLET, FILM COATED ORAL
Refills: 0 | Status: ACTIVE | COMMUNITY

## 2025-04-11 ENCOUNTER — APPOINTMENT (OUTPATIENT)
Dept: UROLOGY | Facility: CLINIC | Age: 65
End: 2025-04-11